# Patient Record
Sex: MALE
[De-identification: names, ages, dates, MRNs, and addresses within clinical notes are randomized per-mention and may not be internally consistent; named-entity substitution may affect disease eponyms.]

---

## 2021-03-02 PROBLEM — Z00.00 ENCOUNTER FOR PREVENTIVE HEALTH EXAMINATION: Status: ACTIVE | Noted: 2021-03-02

## 2021-03-03 ENCOUNTER — APPOINTMENT (OUTPATIENT)
Dept: UROLOGY | Facility: CLINIC | Age: 51
End: 2021-03-03
Payer: COMMERCIAL

## 2021-03-03 VITALS
OXYGEN SATURATION: 97 % | HEIGHT: 68 IN | DIASTOLIC BLOOD PRESSURE: 82 MMHG | SYSTOLIC BLOOD PRESSURE: 129 MMHG | BODY MASS INDEX: 31.22 KG/M2 | WEIGHT: 206 LBS | HEART RATE: 80 BPM

## 2021-03-03 PROCEDURE — 99072 ADDL SUPL MATRL&STAF TM PHE: CPT

## 2021-03-03 PROCEDURE — 99203 OFFICE O/P NEW LOW 30 MIN: CPT

## 2021-03-04 LAB
APPEARANCE: CLEAR
BACTERIA: NEGATIVE
BILIRUBIN URINE: NEGATIVE
BLOOD URINE: NEGATIVE
COLOR: NORMAL
GLUCOSE QUALITATIVE U: NEGATIVE
HYALINE CASTS: 0 /LPF
KETONES URINE: NEGATIVE
LEUKOCYTE ESTERASE URINE: NEGATIVE
MICROSCOPIC-UA: NORMAL
NITRITE URINE: NEGATIVE
PH URINE: 5.5
PROTEIN URINE: NEGATIVE
RED BLOOD CELLS URINE: 1 /HPF
SPECIFIC GRAVITY URINE: 1.02
SQUAMOUS EPITHELIAL CELLS: 0 /HPF
UROBILINOGEN URINE: NORMAL
WHITE BLOOD CELLS URINE: 1 /HPF

## 2021-03-05 LAB — BACTERIA UR CULT: NORMAL

## 2021-03-06 NOTE — HISTORY OF PRESENT ILLNESS
[FreeTextEntry1] : This patient presents with a complaint of recurrent urinary tract infection over the past 4 months. He denies any current symptoms but admits to a slow urinary flow.

## 2021-03-06 NOTE — ASSESSMENT
[FreeTextEntry1] : Labs and renal sonogram are ordered. Patient will return to the office for a prostate ultrasound.

## 2021-03-09 LAB — URINE CYTOLOGY: NORMAL

## 2021-03-15 LAB — PSA SERPL-MCNC: 4.47 NG/ML

## 2021-03-19 ENCOUNTER — APPOINTMENT (OUTPATIENT)
Dept: UROLOGY | Facility: CLINIC | Age: 51
End: 2021-03-19
Payer: COMMERCIAL

## 2021-03-19 DIAGNOSIS — N39.0 URINARY TRACT INFECTION, SITE NOT SPECIFIED: ICD-10-CM

## 2021-03-19 PROCEDURE — 76872 US TRANSRECTAL: CPT

## 2021-03-19 PROCEDURE — 51741 ELECTRO-UROFLOWMETRY FIRST: CPT

## 2021-03-19 PROCEDURE — 99072 ADDL SUPL MATRL&STAF TM PHE: CPT

## 2021-03-19 PROCEDURE — 76857 US EXAM PELVIC LIMITED: CPT

## 2021-03-22 ENCOUNTER — NON-APPOINTMENT (OUTPATIENT)
Age: 51
End: 2021-03-22

## 2021-03-25 ENCOUNTER — APPOINTMENT (OUTPATIENT)
Dept: UROLOGY | Facility: CLINIC | Age: 51
End: 2021-03-25
Payer: COMMERCIAL

## 2021-03-25 VITALS
OXYGEN SATURATION: 98 % | WEIGHT: 206 LBS | HEIGHT: 68 IN | DIASTOLIC BLOOD PRESSURE: 84 MMHG | SYSTOLIC BLOOD PRESSURE: 132 MMHG | BODY MASS INDEX: 31.22 KG/M2 | HEART RATE: 72 BPM

## 2021-03-25 DIAGNOSIS — R97.20 ELEVATED PROSTATE, SPECIFIC ANTIGEN [PSA]: ICD-10-CM

## 2021-03-25 PROCEDURE — 99072 ADDL SUPL MATRL&STAF TM PHE: CPT

## 2021-03-25 PROCEDURE — 55700: CPT

## 2021-03-25 PROCEDURE — 76942 ECHO GUIDE FOR BIOPSY: CPT

## 2021-03-26 ENCOUNTER — NON-APPOINTMENT (OUTPATIENT)
Age: 51
End: 2021-03-26

## 2021-03-31 ENCOUNTER — APPOINTMENT (OUTPATIENT)
Dept: ULTRASOUND IMAGING | Facility: CLINIC | Age: 51
End: 2021-03-31
Payer: COMMERCIAL

## 2021-03-31 ENCOUNTER — OUTPATIENT (OUTPATIENT)
Dept: OUTPATIENT SERVICES | Facility: HOSPITAL | Age: 51
LOS: 1 days | End: 2021-03-31
Payer: COMMERCIAL

## 2021-03-31 DIAGNOSIS — N40.1 BENIGN PROSTATIC HYPERPLASIA WITH LOWER URINARY TRACT SYMPTOMS: ICD-10-CM

## 2021-03-31 PROCEDURE — 76770 US EXAM ABDO BACK WALL COMP: CPT | Mod: 26

## 2021-03-31 PROCEDURE — 76770 US EXAM ABDO BACK WALL COMP: CPT

## 2021-04-01 ENCOUNTER — NON-APPOINTMENT (OUTPATIENT)
Age: 51
End: 2021-04-01

## 2022-03-15 LAB
APPEARANCE: CLEAR
BACTERIA: NEGATIVE
BILIRUBIN URINE: NEGATIVE
BLOOD URINE: NEGATIVE
COLOR: YELLOW
GLUCOSE QUALITATIVE U: NEGATIVE
HYALINE CASTS: 0 /LPF
KETONES URINE: NEGATIVE
LEUKOCYTE ESTERASE URINE: NEGATIVE
MICROSCOPIC-UA: NORMAL
NITRITE URINE: NEGATIVE
PH URINE: 6
PROTEIN URINE: NORMAL
PSA SERPL-MCNC: 2.25 NG/ML
RED BLOOD CELLS URINE: 2 /HPF
SPECIFIC GRAVITY URINE: 1.03
SQUAMOUS EPITHELIAL CELLS: 0 /HPF
UROBILINOGEN URINE: NORMAL
WHITE BLOOD CELLS URINE: 1 /HPF

## 2022-03-16 LAB — BACTERIA UR CULT: NORMAL

## 2022-03-17 LAB — URINE CYTOLOGY: NORMAL

## 2022-03-29 ENCOUNTER — APPOINTMENT (OUTPATIENT)
Dept: UROLOGY | Facility: CLINIC | Age: 52
End: 2022-03-29
Payer: COMMERCIAL

## 2022-03-29 VITALS
SYSTOLIC BLOOD PRESSURE: 130 MMHG | DIASTOLIC BLOOD PRESSURE: 84 MMHG | HEIGHT: 68 IN | BODY MASS INDEX: 31.07 KG/M2 | OXYGEN SATURATION: 97 % | HEART RATE: 74 BPM | WEIGHT: 205 LBS

## 2022-03-29 PROCEDURE — 99213 OFFICE O/P EST LOW 20 MIN: CPT

## 2022-03-29 NOTE — HISTORY OF PRESENT ILLNESS
[FreeTextEntry1] : This patient presents for a checkup.  He is doing well on current medications and his labs are fine

## 2023-05-11 ENCOUNTER — NON-APPOINTMENT (OUTPATIENT)
Age: 53
End: 2023-05-11

## 2023-05-12 ENCOUNTER — APPOINTMENT (OUTPATIENT)
Dept: UROLOGY | Facility: CLINIC | Age: 53
End: 2023-05-12
Payer: COMMERCIAL

## 2023-05-12 VITALS
TEMPERATURE: 98.1 F | OXYGEN SATURATION: 96 % | SYSTOLIC BLOOD PRESSURE: 136 MMHG | DIASTOLIC BLOOD PRESSURE: 83 MMHG | HEART RATE: 79 BPM | HEIGHT: 68 IN | BODY MASS INDEX: 30.92 KG/M2 | RESPIRATION RATE: 14 BRPM | WEIGHT: 204 LBS

## 2023-05-12 DIAGNOSIS — N48.6 INDURATION PENIS PLASTICA: ICD-10-CM

## 2023-05-12 DIAGNOSIS — N13.8 BENIGN PROSTATIC HYPERPLASIA WITH LOWER URINARY TRACT SYMPMS: ICD-10-CM

## 2023-05-12 DIAGNOSIS — N40.1 BENIGN PROSTATIC HYPERPLASIA WITH LOWER URINARY TRACT SYMPMS: ICD-10-CM

## 2023-05-12 PROCEDURE — 99214 OFFICE O/P EST MOD 30 MIN: CPT

## 2023-05-15 LAB
APPEARANCE: CLEAR
BACTERIA: NEGATIVE /HPF
BILIRUBIN URINE: NEGATIVE
BLOOD URINE: NEGATIVE
CAST: 0 /LPF
COLOR: YELLOW
EPITHELIAL CELLS: 0 /HPF
GLUCOSE QUALITATIVE U: NEGATIVE MG/DL
KETONES URINE: NEGATIVE MG/DL
LEUKOCYTE ESTERASE URINE: NEGATIVE
MICROSCOPIC-UA: NORMAL
NITRITE URINE: NEGATIVE
PH URINE: 5.5
PROTEIN URINE: NEGATIVE MG/DL
PSA SERPL-MCNC: 2.15 NG/ML
RED BLOOD CELLS URINE: 0 /HPF
SPECIFIC GRAVITY URINE: 1.02
UROBILINOGEN URINE: 0.2 MG/DL
WHITE BLOOD CELLS URINE: 1 /HPF

## 2023-05-15 NOTE — END OF VISIT
[FreeTextEntry3] : Labs are sent. He was advised to use vitamin E 400mg daily. Pt will follow up in 1 year or as needed.

## 2023-05-15 NOTE — PHYSICAL EXAM
[General Appearance - Well Developed] : well developed [General Appearance - Well Nourished] : well nourished [Normal Appearance] : normal appearance [Well Groomed] : well groomed [General Appearance - In No Acute Distress] : no acute distress [Abdomen Soft] : soft [Abdomen Tenderness] : non-tender [Costovertebral Angle Tenderness] : no ~M costovertebral angle tenderness [Urethral Meatus] : meatus normal [Urinary Bladder Findings] : the bladder was normal on palpation [Scrotum] : the scrotum was normal [Testes Mass (___cm)] : there were no testicular masses [No Prostate Nodules] : no prostate nodules [FreeTextEntry1] : 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  [Edema] : no peripheral edema [] : no respiratory distress [Respiration, Rhythm And Depth] : normal respiratory rhythm and effort [Exaggerated Use Of Accessory Muscles For Inspiration] : no accessory muscle use [Oriented To Time, Place, And Person] : oriented to person, place, and time [Affect] : the affect was normal [Mood] : the mood was normal [Not Anxious] : not anxious [Normal Station and Gait] : the gait and station were normal for the patient's age [No Focal Deficits] : no focal deficits [No Palpable Adenopathy] : no palpable adenopathy

## 2023-05-15 NOTE — HISTORY OF PRESENT ILLNESS
[FreeTextEntry1] : 53M presents today requesting something on his penis evaluated. Pt has found an area to be evaluated.

## 2023-05-16 LAB — URINE CYTOLOGY: NORMAL

## 2023-05-17 ENCOUNTER — NON-APPOINTMENT (OUTPATIENT)
Age: 53
End: 2023-05-17

## 2023-07-13 RX ORDER — FINASTERIDE 5 MG/1
5 TABLET, FILM COATED ORAL
Qty: 90 | Refills: 3 | Status: ACTIVE | COMMUNITY
Start: 2021-03-19 | End: 1900-01-01

## 2023-07-13 RX ORDER — TAMSULOSIN HYDROCHLORIDE 0.4 MG/1
0.4 CAPSULE ORAL
Qty: 90 | Refills: 3 | Status: ACTIVE | COMMUNITY
Start: 2021-03-19 | End: 1900-01-01

## 2024-11-19 ENCOUNTER — APPOINTMENT (OUTPATIENT)
Dept: UROLOGY | Facility: CLINIC | Age: 54
End: 2024-11-19
Payer: COMMERCIAL

## 2024-11-19 VITALS
HEART RATE: 79 BPM | SYSTOLIC BLOOD PRESSURE: 126 MMHG | BODY MASS INDEX: 30.92 KG/M2 | RESPIRATION RATE: 16 BRPM | OXYGEN SATURATION: 96 % | HEIGHT: 68 IN | DIASTOLIC BLOOD PRESSURE: 82 MMHG | WEIGHT: 204 LBS

## 2024-11-19 DIAGNOSIS — N40.1 BENIGN PROSTATIC HYPERPLASIA WITH LOWER URINARY TRACT SYMPMS: ICD-10-CM

## 2024-11-19 DIAGNOSIS — N13.8 BENIGN PROSTATIC HYPERPLASIA WITH LOWER URINARY TRACT SYMPMS: ICD-10-CM

## 2024-11-19 PROCEDURE — 99213 OFFICE O/P EST LOW 20 MIN: CPT

## 2024-11-20 LAB
APPEARANCE: CLEAR
BACTERIA: NEGATIVE /HPF
BILIRUBIN URINE: NEGATIVE
BLOOD URINE: NEGATIVE
CAST: 0 /LPF
COLOR: YELLOW
EPITHELIAL CELLS: 0 /HPF
GLUCOSE QUALITATIVE U: NEGATIVE MG/DL
KETONES URINE: NEGATIVE MG/DL
LEUKOCYTE ESTERASE URINE: NEGATIVE
MICROSCOPIC-UA: NORMAL
NITRITE URINE: NEGATIVE
PH URINE: 5.5
PROTEIN URINE: NEGATIVE MG/DL
PSA SERPL-MCNC: 2.2 NG/ML
RED BLOOD CELLS URINE: 0 /HPF
SPECIFIC GRAVITY URINE: 1.02
UROBILINOGEN URINE: 0.2 MG/DL
WHITE BLOOD CELLS URINE: 0 /HPF

## 2024-11-21 LAB — BACTERIA UR CULT: NORMAL

## 2024-11-22 ENCOUNTER — NON-APPOINTMENT (OUTPATIENT)
Age: 54
End: 2024-11-22

## 2024-11-22 LAB — URINE CYTOLOGY: NORMAL

## 2025-02-07 ENCOUNTER — RX RENEWAL (OUTPATIENT)
Age: 55
End: 2025-02-07